# Patient Record
Sex: FEMALE | Race: BLACK OR AFRICAN AMERICAN | Employment: UNEMPLOYED
[De-identification: names, ages, dates, MRNs, and addresses within clinical notes are randomized per-mention and may not be internally consistent; named-entity substitution may affect disease eponyms.]

---

## 2019-11-08 ENCOUNTER — HOSPITAL ENCOUNTER (OUTPATIENT)
Dept: PHYSICAL THERAPY | Facility: CLINIC | Age: 34
Setting detail: THERAPIES SERIES
Discharge: HOME | End: 2019-11-08
Payer: MEDICARE

## 2019-11-15 ENCOUNTER — HOSPITAL ENCOUNTER (OUTPATIENT)
Dept: PHYSICAL THERAPY | Facility: CLINIC | Age: 34
Setting detail: THERAPIES SERIES
Discharge: HOME | End: 2019-11-15
Payer: MEDICARE

## 2019-11-15 PROCEDURE — 97161 PT EVAL LOW COMPLEX 20 MIN: CPT

## 2019-11-15 PROCEDURE — 97110 THERAPEUTIC EXERCISES: CPT

## 2019-11-20 ENCOUNTER — HOSPITAL ENCOUNTER (OUTPATIENT)
Dept: PHYSICAL THERAPY | Facility: CLINIC | Age: 34
Setting detail: THERAPIES SERIES
Discharge: HOME | End: 2019-11-20
Payer: MEDICARE

## 2019-11-20 PROCEDURE — 97110 THERAPEUTIC EXERCISES: CPT

## 2019-11-22 ENCOUNTER — HOSPITAL ENCOUNTER (OUTPATIENT)
Dept: PHYSICAL THERAPY | Facility: CLINIC | Age: 34
Setting detail: THERAPIES SERIES
Discharge: HOME | End: 2019-11-22
Payer: MEDICARE

## 2019-11-22 PROCEDURE — 97110 THERAPEUTIC EXERCISES: CPT

## 2019-11-26 ENCOUNTER — HOSPITAL ENCOUNTER (OUTPATIENT)
Dept: PHYSICAL THERAPY | Facility: CLINIC | Age: 34
Setting detail: THERAPIES SERIES
Discharge: HOME | End: 2019-11-26
Payer: MEDICARE

## 2019-11-27 ENCOUNTER — HOSPITAL ENCOUNTER (OUTPATIENT)
Dept: PHYSICAL THERAPY | Facility: CLINIC | Age: 34
Setting detail: THERAPIES SERIES
Discharge: HOME | End: 2019-11-27
Payer: MEDICARE

## 2019-12-03 ENCOUNTER — APPOINTMENT (OUTPATIENT)
Dept: PHYSICAL THERAPY | Facility: CLINIC | Age: 34
End: 2019-12-03
Payer: MEDICARE

## 2019-12-03 ENCOUNTER — HOSPITAL ENCOUNTER (OUTPATIENT)
Dept: PHYSICAL THERAPY | Facility: CLINIC | Age: 34
Setting detail: THERAPIES SERIES
Discharge: HOME | End: 2019-12-03
Payer: MEDICARE

## 2019-12-03 PROCEDURE — 97110 THERAPEUTIC EXERCISES: CPT

## 2019-12-04 ENCOUNTER — HOSPITAL ENCOUNTER (OUTPATIENT)
Dept: PHYSICAL THERAPY | Facility: CLINIC | Age: 34
Setting detail: THERAPIES SERIES
Discharge: HOME | End: 2019-12-04
Payer: MEDICARE

## 2019-12-10 ENCOUNTER — HOSPITAL ENCOUNTER (OUTPATIENT)
Dept: PHYSICAL THERAPY | Facility: CLINIC | Age: 34
Setting detail: THERAPIES SERIES
Discharge: HOME | End: 2019-12-10
Payer: MEDICARE

## 2019-12-10 PROCEDURE — 97110 THERAPEUTIC EXERCISES: CPT

## 2019-12-12 ENCOUNTER — HOSPITAL ENCOUNTER (OUTPATIENT)
Dept: PHYSICAL THERAPY | Facility: CLINIC | Age: 34
Setting detail: THERAPIES SERIES
Discharge: HOME | End: 2019-12-12
Payer: MEDICARE

## 2019-12-12 PROCEDURE — 97110 THERAPEUTIC EXERCISES: CPT

## 2019-12-17 ENCOUNTER — HOSPITAL ENCOUNTER (OUTPATIENT)
Dept: PHYSICAL THERAPY | Facility: CLINIC | Age: 34
Setting detail: THERAPIES SERIES
Discharge: HOME | End: 2019-12-17
Payer: MEDICARE

## 2019-12-17 PROCEDURE — 97110 THERAPEUTIC EXERCISES: CPT

## 2019-12-20 ENCOUNTER — HOSPITAL ENCOUNTER (OUTPATIENT)
Dept: PHYSICAL THERAPY | Facility: CLINIC | Age: 34
Setting detail: THERAPIES SERIES
Discharge: HOME | End: 2019-12-20
Payer: MEDICARE

## 2019-12-20 PROCEDURE — 97110 THERAPEUTIC EXERCISES: CPT

## 2019-12-23 ENCOUNTER — APPOINTMENT (OUTPATIENT)
Dept: PHYSICAL THERAPY | Facility: CLINIC | Age: 34
End: 2019-12-23
Payer: MEDICARE

## 2019-12-23 ENCOUNTER — HOSPITAL ENCOUNTER (OUTPATIENT)
Dept: PHYSICAL THERAPY | Facility: CLINIC | Age: 34
Setting detail: THERAPIES SERIES
Discharge: HOME | End: 2019-12-23
Payer: MEDICARE

## 2019-12-26 ENCOUNTER — APPOINTMENT (OUTPATIENT)
Dept: PHYSICAL THERAPY | Facility: CLINIC | Age: 34
End: 2019-12-26
Payer: MEDICARE

## 2019-12-27 ENCOUNTER — HOSPITAL ENCOUNTER (OUTPATIENT)
Dept: PHYSICAL THERAPY | Facility: CLINIC | Age: 34
Setting detail: THERAPIES SERIES
Discharge: HOME | End: 2019-12-27
Payer: MEDICARE

## 2019-12-27 PROCEDURE — 97110 THERAPEUTIC EXERCISES: CPT

## 2020-01-03 ENCOUNTER — APPOINTMENT (OUTPATIENT)
Dept: PHYSICAL THERAPY | Facility: CLINIC | Age: 35
End: 2020-01-03
Payer: MEDICARE

## 2020-01-03 ENCOUNTER — HOSPITAL ENCOUNTER (OUTPATIENT)
Dept: PHYSICAL THERAPY | Facility: CLINIC | Age: 35
Setting detail: THERAPIES SERIES
Discharge: HOME | End: 2020-01-03
Payer: MEDICARE

## 2020-01-07 ENCOUNTER — HOSPITAL ENCOUNTER (OUTPATIENT)
Dept: PHYSICAL THERAPY | Facility: CLINIC | Age: 35
Setting detail: THERAPIES SERIES
Discharge: HOME | End: 2020-01-07
Payer: MEDICARE

## 2020-01-07 PROCEDURE — 97110 THERAPEUTIC EXERCISES: CPT

## 2020-01-07 NOTE — FLOWSHEET NOTE
Bridges x20 BTB Added 11/20   Heel slides x20   Added 11/20; Bilaterally   PROM   Added 11/20; 12/17/19- HELD               Sidelying      Abduction x20 ea 2# Added weight 12/10/19- bilaterally Progressed weight 1/7   Clam shells x10 ea Lime  Added 12/12          Prone      Hip ext  x20 ea 1# Added weight 12/10/19; 12/17/19 HELD         Standing      Standing calf stretch- wedge 3x30\" ea LOW Added 11/20   Standing quad str. 2x30\" ea  B LEs; UE holding LE BEHIND; cues for dec hip ABD; opp UE to A   TG SL squats x20 ea Lv 7 CUES for form; < 90/90 for DL squats, added Blue tband around knees for squats 12/3/19; resume next visit   Step ups x10 ea 6\"; PTB With blue tband around knees- added 12/3/19; B LEs; PTB- 12/27/2019   Step downs x10 ea 6\"; PTB With blue tband around knees- added 12/3/19; PTB- 12/27/2019   3 way hip x10 ea PTB Added 12/10/19; B LEs; 1 UE A; PTB- 12/27/2019   DL HR- off stair edge x20 ea 6\" NEW- 12/20/2019; UE A   Goal update     COMPLETED- 12/20/2019   LEFS   COMPLETED- 12/20/2019   Heel taps x10 ea 6\"; PTB B LEs; B UE A; min cues; NEW- 12/20/2019; PTB- 12/27/2019   Resisted side-stepping  2x30\"  PTB    // BAR SQUATS x20 UE A 90/90         Tandem stance on airex 2x20\" ea  Added 1/7   SLS balance 3x20\" ea  Added 1/7   Stationary lunges 10x ea  Added 1/7                       Other: BOLD IS COMPLETED. Specific Instructions for next treatment: Consider bolded interventions above next visit- begin with next session. Focus upon stabilization interventions to prevent further instability and poor quad control. Treatment Charges: Mins Units   []  Modalities     [x]  Ther Exercise 40 3   []  Manual Therapy     []  Ther Activities     []  Aquatics     []  Vasocompression     []  Other     Total Treatment time 40 3     [x8' on upright bike]    Assessment: [x] Progressing toward goals. Pt with overall good tolerance to treatment this date.  Able to increase patient with added exercises to further challenge her. Able to complete SLS and tandem balance with out LOB. Intermittent cueing to ensure proper form when completing exercises. Will continue to progress as able in future visits. [] No change. [] Other:    STG: (to be met in 6 treatments)  1. ? Pain: Patient will report no pain at rest and < 5/10 pain with active movement in order to improve QOL. - PROGRESSING- 0-5/10 over this past week; < episodes of 5/10 pain  2. ? ROM: Pt will demo R knee AROM flexion to equal L and with < 3/10 P in order to improve functional mobility.- MET  3. ? Strength: Pt will demo x20 SLR flexion of R LE through full ROM and with < 3/10 P in order to better match L and improve quad endurance for ambulation, stairs. Will demo dec hyperextension of both knees in standing, functional activities in order to provide protection to posterior ligaments.- PROGRESSING- x20 SLR flexion of B LEs without P, but continued challenge; continued cues for dec hyperextension during interventions in clinic   4. ? Function: Patient will report decreased TTP to R patellar tendon, lateral knee joint line in order to indicate decreased inflammation and improved healing of injured site. - PROGRESSING- continued R patellar tendon tenderness; dec along B knee lateral joint lines  5. Independent with Home Exercise Programs- PROGRESSING- reports intermittent performance d/t hectic home life   LTG: (to be met in 12 treatments)  1. Will demo 20\" SLS balance of R LE in order to better match L LE and improve stair, hill negotiation. Also proper squat mechanics with < 5/10 P for improved lifting, fitness tolerance. 2. Will ambulate full work shift with < 5/10 P in order to improve work tolerance. 3. Improve LEFS to 40/80- MET- 41/80    NEW GOAL- 12/20/2019:  Improve LEFS to 55/80           Patient goals: \"less pain. \"- PROGRESSING    Pt.  Education:  [x] Yes  [] No  [x] Reviewed Prior HEP/Ed  Method of Education: [x] Verbal  [] Demo  [x] Written -  12/12- 3 way hip (standing and table), bridging, clam shells, step ups, gastroc stretching - theraband provided  Comprehension of Education:  [x] Verbalizes understanding. [x] Demonstrates understanding. [x] Needs review. [] Demonstrates/verbalizes HEP/Ed previously given. 12/20/2019- Rec continued PT services until x12 visits in order to focus upon SLS balance activities and improved tolerance for group fitness classes- verbalized understanding to all. Plan: [x] Continue per plan of care.    [] Other:       Time In: 150PM          Time Out: 245 PM    Electronically signed by:  Luh Tee PTA

## 2020-01-09 ENCOUNTER — APPOINTMENT (OUTPATIENT)
Dept: PHYSICAL THERAPY | Facility: CLINIC | Age: 35
End: 2020-01-09
Payer: MEDICARE

## 2020-01-10 ENCOUNTER — HOSPITAL ENCOUNTER (OUTPATIENT)
Dept: PHYSICAL THERAPY | Facility: CLINIC | Age: 35
Setting detail: THERAPIES SERIES
Discharge: HOME | End: 2020-01-10
Payer: MEDICARE

## 2020-01-10 PROCEDURE — 97110 THERAPEUTIC EXERCISES: CPT

## 2020-01-10 NOTE — FLOWSHEET NOTE
[] [facility]) - [facility]  [address]  [phone]  F: [phone] [x] [address]  [facility] [place] [facility]   [address]  P: [phone]  F: [phone] [] [address] &  Therapy  [address] [facility]  P: [phone]  F: [phone] [] [address]  [facility]   [address]   [place]: [phone]  F: [phone]      Physical Therapy Daily Treatment Note    Date:  1/10/2020  Patient Name:  Art De León \"Gnoc-ivelisse\"   :  1985  MRN: [id number]  Physician: Dr. Davina Garcia MD     Insurance: 30 v  Diagnosis: Right knee pain   Onset Date:  chronic denies recent flare up  Next Dr. Lara Stain: 2020  Visit# / total visits:  ( updated  )  Cancels/No Shows: 4/0    Subjective:    Pain:  [x] Yes  [] No Location: R knee- infrapatellar, lateral joint line Pain Rating: (0-10 scale) 2/10- \"achey\"  Pain altered Tx:  [x] No  [] Yes  Action: N/A  Comments: Pt reports her knee is alright today. Reports it was a little achy yesterday. States she has been going to the gym as well. Objective:   Todays Treatment:   Modalities: CP PRN   Precautions: chronic B knee pain- R > L- unclear R meniscus pathology- pain may be secondary to patellar instability; pain localized to infrapatellar and lateral joint line; no previous PT services or treatments   Exercises: Exercises completed bilaterally   Exercise Reps/ Time Weight/ Level Comments   Nustep   Added - HELD; SEE BELOW    Rec bike x5' L4 NEW- 2019; full revolutions; increased to L4 19   Upright bike x8' S2-4  L2.5 NEW- 2019   Supine      SLR 15x ea 2# Increased reps and added weight 12/10/19 Increased weight 1/   SAQ 10x5\" holds 1# Added weight 12/10/19; bilaterally    Clamshells x20 ea BTB Added ; B   Bridges x20 BTB Added  [email or web address] provided  Comprehension of Education:  [x] Verbalizes understanding. [x] Demonstrates understanding. [x] Needs review. [] Demonstrates/verbalizes HEP/Ed previously given. 12/20/2019- Rec continued PT services until x12 visits in order to focus upon SLS balance activities and improved tolerance for group fitness classes- verbalized understanding to all. Plan: [x] Continue per plan of care.    [] Other:       Time In: 910 AM          Time Out: 10:00 aM    Electronically signed by:  Anya Downs PTA

## 2020-01-15 ENCOUNTER — HOSPITAL ENCOUNTER (OUTPATIENT)
Dept: PHYSICAL THERAPY | Facility: CLINIC | Age: 35
Setting detail: THERAPIES SERIES
Discharge: HOME | End: 2020-01-15
Payer: MEDICARE

## 2020-01-22 ENCOUNTER — HOSPITAL ENCOUNTER (OUTPATIENT)
Dept: PHYSICAL THERAPY | Facility: CLINIC | Age: 35
Setting detail: THERAPIES SERIES
Discharge: HOME | End: 2020-01-22
Payer: MEDICARE

## 2020-01-24 ENCOUNTER — HOSPITAL ENCOUNTER (OUTPATIENT)
Dept: PHYSICAL THERAPY | Facility: CLINIC | Age: 35
Setting detail: THERAPIES SERIES
Discharge: HOME | End: 2020-01-24
Payer: MEDICARE

## 2020-01-24 PROCEDURE — 97110 THERAPEUTIC EXERCISES: CPT

## 2020-01-24 NOTE — DISCHARGE SUMMARY
verbalized understanding to all. Added PTB loop for previous interventions in order to emphasize dec B knee medial collapse and issued for HEP. Also performed goal update and re-assessed LEFS this date. Meets majority of therapy goals, and demos sig improved LEFS score. Therefore, discharged today from PT services. STG: (to be met in 6 treatments)  1. ? Pain: Patient will report no pain at rest and < 5/10 pain with active movement in order to improve QOL.- MET  2. ? ROM: Pt will demo R knee AROM flexion to equal L and with < 3/10 P in order to improve functional mobility.- MET  3. ? Strength: Pt will demo x20 SLR flexion of R LE through full ROM and with < 3/10 P in order to better match L and improve quad endurance for ambulation, stairs. Will demo dec hyperextension of both knees in standing, functional activities in order to provide protection to posterior ligaments. - MET- demos sig improved proprioception and corrects for B knee hyperextension during all interventions in clinic   4. ? Function: Patient will report decreased TTP to R patellar tendon, lateral knee joint line in order to indicate decreased inflammation and improved healing of injured site. - PROGRESSING- continued R patellar tendon tenderness; dec along B knee lateral joint lines  5. Independent with Home Exercise Programs- PROGRESSING- reports intermittent performance d/t hectic home life   LTG: (to be met in 12 treatments)  1. Will demo 20\" SLS balance of R LE in order to better match L LE and improve stair, hill negotiation. Also proper squat mechanics with < 5/10 P for improved lifting, fitness tolerance. - MET  2. Will ambulate full work shift with < 5/10 P in order to improve work tolerance. - MET- \"I've got more good days then bad days. \"   3. Improve LEFS to 40/80- MET- 41/80     NEW GOAL- 12/20/2019:  Improve LEFS to 55/80- MET- 63/80     Patient goals: \"less pain. \"- PROGRESSING    Treatment to Date:  [x] Therapeutic Exercise    [x]

## 2020-01-24 NOTE — FLOWSHEET NOTE
hectic home life   LTG: (to be met in 12 treatments)  1. Will demo 20\" SLS balance of R LE in order to better match L LE and improve stair, hill negotiation. Also proper squat mechanics with < 5/10 P for improved lifting, fitness tolerance. - MET  2. Will ambulate full work shift with < 5/10 P in order to improve work tolerance. - MET- \"I've got more good days then bad days. \"   3. Improve LEFS to 40/80- MET- 41/80     NEW GOAL- 12/20/2019:  Improve LEFS to 55/80- MET- 63/80           Patient goals: \"less pain. \"- PROGRESSING    Pt. Education:  [x] Yes  [] No  [x] Reviewed Prior HEP/Ed  Method of Education: [] Verbal  [] Demo  [] Written -  12/12- 3 way hip (standing and table), bridging, clam shells, step ups, gastroc stretching - theraband provided  Comprehension of Education:  [] Verbalizes understanding. [] Demonstrates understanding. [] Needs review. [x] Demonstrates/verbalizes HEP/Ed previously given. 12/20/2019- Rec continued PT services until x12 visits in order to focus upon SLS balance activities and improved tolerance for group fitness classes- verbalized understanding to all.   1/24/2020- Discharge today from PT services. Issued PTB loop for HEP interventions. Plan: [x] Continue per plan of care. [x] Other: Discharge today from PT services.         Time In: 1PM          Time Out: 1:40PM    Electronically signed by:  Eddie Barbosa, PT

## 2021-06-12 ENCOUNTER — HOSPITAL ENCOUNTER (EMERGENCY)
Age: 36
Discharge: HOME | End: 2021-06-13
Attending: EMERGENCY MEDICINE
Payer: MEDICARE

## 2021-06-12 VITALS
HEIGHT: 64 IN | HEART RATE: 103 BPM | TEMPERATURE: 98.6 F | BODY MASS INDEX: 39.27 KG/M2 | OXYGEN SATURATION: 100 % | WEIGHT: 230 LBS | RESPIRATION RATE: 20 BRPM | SYSTOLIC BLOOD PRESSURE: 143 MMHG | DIASTOLIC BLOOD PRESSURE: 84 MMHG

## 2021-06-12 DIAGNOSIS — S39.012A BACK STRAIN, INITIAL ENCOUNTER: Primary | ICD-10-CM

## 2021-06-12 PROCEDURE — 6370000000 HC RX 637 (ALT 250 FOR IP): Performed by: EMERGENCY MEDICINE

## 2021-06-12 RX ORDER — LORAZEPAM 1 MG/1
1 TABLET ORAL ONCE
Status: COMPLETED | OUTPATIENT
Start: 2021-06-12 | End: 2021-06-12

## 2021-06-12 RX ORDER — FOLIC ACID 1 MG/1
TABLET ORAL
COMMUNITY

## 2021-06-12 RX ORDER — METOPROLOL SUCCINATE 25 MG/1
TABLET, EXTENDED RELEASE ORAL
COMMUNITY

## 2021-06-12 RX ORDER — LORATADINE 10 MG/1
TABLET ORAL
COMMUNITY

## 2021-06-12 RX ORDER — ICOSAPENT ETHYL 1000 MG/1
CAPSULE ORAL
COMMUNITY

## 2021-06-12 RX ORDER — HYDROXYUREA 500 MG/1
CAPSULE ORAL
COMMUNITY

## 2021-06-12 RX ORDER — MONTELUKAST SODIUM 10 MG/1
TABLET ORAL
COMMUNITY

## 2021-06-12 RX ORDER — ERGOCALCIFEROL (VITAMIN D2) 1250 MCG
CAPSULE ORAL
COMMUNITY

## 2021-06-12 RX ORDER — FERROUS SULFATE 325(65) MG
325 TABLET ORAL
COMMUNITY

## 2021-06-12 RX ORDER — FLUTICASONE PROPIONATE 50 MCG
SPRAY, SUSPENSION (ML) NASAL
COMMUNITY

## 2021-06-12 RX ORDER — LISINOPRIL 5 MG/1
TABLET ORAL
COMMUNITY

## 2021-06-12 RX ORDER — HYDROCODONE BITARTRATE AND ACETAMINOPHEN 5; 325 MG/1; MG/1
1 TABLET ORAL ONCE
Status: COMPLETED | OUTPATIENT
Start: 2021-06-12 | End: 2021-06-12

## 2021-06-12 RX ADMIN — HYDROCODONE BITARTRATE AND ACETAMINOPHEN 1 TABLET: 5; 325 TABLET ORAL at 23:37

## 2021-06-12 RX ADMIN — LORAZEPAM 1 MG: 1 TABLET ORAL at 23:37

## 2021-06-12 ASSESSMENT — PAIN SCALES - GENERAL
PAINLEVEL_OUTOF10: 10
PAINLEVEL_OUTOF10: 10

## 2021-06-12 ASSESSMENT — PAIN DESCRIPTION - PAIN TYPE: TYPE: ACUTE PAIN

## 2021-06-12 ASSESSMENT — PAIN DESCRIPTION - FREQUENCY: FREQUENCY: INTERMITTENT

## 2021-06-12 ASSESSMENT — PAIN DESCRIPTION - LOCATION: LOCATION: BACK

## 2021-06-12 ASSESSMENT — PAIN DESCRIPTION - DESCRIPTORS: DESCRIPTORS: TENDER;THROBBING

## 2021-06-12 ASSESSMENT — PAIN DESCRIPTION - ORIENTATION: ORIENTATION: UPPER;RIGHT

## 2021-06-13 ENCOUNTER — APPOINTMENT (OUTPATIENT)
Dept: GENERAL RADIOLOGY | Age: 36
End: 2021-06-13
Payer: MEDICARE

## 2021-06-13 VITALS
BODY MASS INDEX: 39.27 KG/M2 | WEIGHT: 230 LBS | OXYGEN SATURATION: 97 % | RESPIRATION RATE: 16 BRPM | DIASTOLIC BLOOD PRESSURE: 82 MMHG | TEMPERATURE: 98.3 F | SYSTOLIC BLOOD PRESSURE: 130 MMHG | HEIGHT: 64 IN | HEART RATE: 90 BPM

## 2021-06-13 DIAGNOSIS — S39.012A BACK STRAIN, INITIAL ENCOUNTER: Primary | ICD-10-CM

## 2021-06-13 DIAGNOSIS — N30.00 ACUTE CYSTITIS WITHOUT HEMATURIA: ICD-10-CM

## 2021-06-13 LAB
ABSOLUTE EOS #: 0.04 K/UL (ref 0–0.44)
ABSOLUTE IMMATURE GRANULOCYTE: 0.03 K/UL (ref 0–0.3)
ABSOLUTE LYMPH #: 2.03 K/UL (ref 1.1–3.7)
ABSOLUTE MONO #: 0.53 K/UL (ref 0.1–1.2)
ANION GAP SERPL CALCULATED.3IONS-SCNC: 15 MMOL/L (ref 9–17)
BASOPHILS # BLD: 0 % (ref 0–2)
BASOPHILS ABSOLUTE: <0.03 K/UL (ref 0–0.2)
BUN BLDV-MCNC: 6 MG/DL (ref 6–20)
BUN/CREAT BLD: 10 (ref 9–20)
CALCIUM SERPL-MCNC: 9 MG/DL (ref 8.6–10.4)
CHLORIDE BLD-SCNC: 96 MMOL/L (ref 98–107)
CO2: 25 MMOL/L (ref 20–31)
CREAT SERPL-MCNC: 0.61 MG/DL (ref 0.5–0.9)
D-DIMER QUANTITATIVE: 0.34 MG/L FEU (ref 0–0.59)
DIFFERENTIAL TYPE: ABNORMAL
EOSINOPHILS RELATIVE PERCENT: 0 % (ref 1–4)
GFR AFRICAN AMERICAN: >60 ML/MIN
GFR NON-AFRICAN AMERICAN: >60 ML/MIN
GFR SERPL CREATININE-BSD FRML MDRD: ABNORMAL ML/MIN/{1.73_M2}
GFR SERPL CREATININE-BSD FRML MDRD: ABNORMAL ML/MIN/{1.73_M2}
GLUCOSE BLD-MCNC: 93 MG/DL (ref 70–99)
HCG QUALITATIVE: NEGATIVE
HCT VFR BLD CALC: 28.2 % (ref 36.3–47.1)
HEMOGLOBIN: 9.6 G/DL (ref 11.9–15.1)
IMMATURE GRANULOCYTES: 0 %
LYMPHOCYTES # BLD: 23 % (ref 24–43)
MCH RBC QN AUTO: 28.7 PG (ref 25.2–33.5)
MCHC RBC AUTO-ENTMCNC: 34 G/DL (ref 28.4–34.8)
MCV RBC AUTO: 84.4 FL (ref 82.6–102.9)
MONOCYTES # BLD: 6 % (ref 3–12)
NRBC AUTOMATED: 0 PER 100 WBC
PDW BLD-RTO: 17.1 % (ref 11.8–14.4)
PLATELET # BLD: 151 K/UL (ref 138–453)
PLATELET ESTIMATE: ABNORMAL
PMV BLD AUTO: 11.5 FL (ref 8.1–13.5)
POTASSIUM SERPL-SCNC: 3.6 MMOL/L (ref 3.7–5.3)
RBC # BLD: 3.34 M/UL (ref 3.95–5.11)
RBC # BLD: ABNORMAL 10*6/UL
SEG NEUTROPHILS: 71 % (ref 36–65)
SEGMENTED NEUTROPHILS ABSOLUTE COUNT: 6.28 K/UL (ref 1.5–8.1)
SODIUM BLD-SCNC: 136 MMOL/L (ref 135–144)
TROPONIN INTERP: NORMAL
TROPONIN T: NORMAL NG/ML
TROPONIN, HIGH SENSITIVITY: <6 NG/L (ref 0–14)
WBC # BLD: 8.9 K/UL (ref 3.5–11.3)
WBC # BLD: ABNORMAL 10*3/UL

## 2021-06-13 PROCEDURE — 96374 THER/PROPH/DIAG INJ IV PUSH: CPT

## 2021-06-13 PROCEDURE — 85025 COMPLETE CBC W/AUTO DIFF WBC: CPT

## 2021-06-13 PROCEDURE — 6360000002 HC RX W HCPCS: Performed by: EMERGENCY MEDICINE

## 2021-06-13 PROCEDURE — 6370000000 HC RX 637 (ALT 250 FOR IP): Performed by: EMERGENCY MEDICINE

## 2021-06-13 PROCEDURE — 85379 FIBRIN DEGRADATION QUANT: CPT

## 2021-06-13 PROCEDURE — 96372 THER/PROPH/DIAG INJ SC/IM: CPT

## 2021-06-13 PROCEDURE — 71045 X-RAY EXAM CHEST 1 VIEW: CPT

## 2021-06-13 PROCEDURE — 80048 BASIC METABOLIC PNL TOTAL CA: CPT

## 2021-06-13 PROCEDURE — 99282 EMERGENCY DEPT VISIT SF MDM: CPT

## 2021-06-13 PROCEDURE — 84484 ASSAY OF TROPONIN QUANT: CPT

## 2021-06-13 PROCEDURE — 87186 SC STD MICRODIL/AGAR DIL: CPT

## 2021-06-13 PROCEDURE — 84703 CHORIONIC GONADOTROPIN ASSAY: CPT

## 2021-06-13 PROCEDURE — 87088 URINE BACTERIA CULTURE: CPT

## 2021-06-13 PROCEDURE — 87086 URINE CULTURE/COLONY COUNT: CPT

## 2021-06-13 PROCEDURE — 93005 ELECTROCARDIOGRAM TRACING: CPT | Performed by: EMERGENCY MEDICINE

## 2021-06-13 RX ORDER — PREDNISONE 20 MG/1
60 TABLET ORAL ONCE
Status: COMPLETED | OUTPATIENT
Start: 2021-06-13 | End: 2021-06-13

## 2021-06-13 RX ORDER — HYDROCODONE BITARTRATE AND ACETAMINOPHEN 5; 325 MG/1; MG/1
1 TABLET ORAL EVERY 6 HOURS PRN
Qty: 10 TABLET | Refills: 0 | Status: SHIPPED | OUTPATIENT
Start: 2021-06-13 | End: 2021-06-16

## 2021-06-13 RX ORDER — MORPHINE SULFATE 4 MG/ML
4 INJECTION, SOLUTION INTRAMUSCULAR; INTRAVENOUS ONCE
Status: COMPLETED | OUTPATIENT
Start: 2021-06-13 | End: 2021-06-13

## 2021-06-13 RX ORDER — PREDNISONE 50 MG/1
50 TABLET ORAL DAILY
Qty: 5 TABLET | Refills: 0 | Status: SHIPPED | OUTPATIENT
Start: 2021-06-13 | End: 2021-06-18

## 2021-06-13 RX ORDER — CEPHALEXIN 500 MG/1
500 CAPSULE ORAL 2 TIMES DAILY
Qty: 14 CAPSULE | Refills: 0 | Status: SHIPPED | OUTPATIENT
Start: 2021-06-13 | End: 2021-06-20

## 2021-06-13 RX ORDER — ORPHENADRINE CITRATE 30 MG/ML
60 INJECTION INTRAMUSCULAR; INTRAVENOUS ONCE
Status: COMPLETED | OUTPATIENT
Start: 2021-06-13 | End: 2021-06-13

## 2021-06-13 RX ORDER — ALPRAZOLAM 0.5 MG/1
0.5 TABLET ORAL EVERY 8 HOURS PRN
Qty: 10 TABLET | Refills: 0 | Status: SHIPPED | OUTPATIENT
Start: 2021-06-13 | End: 2021-06-27

## 2021-06-13 RX ADMIN — ORPHENADRINE CITRATE 60 MG: 60 INJECTION INTRAMUSCULAR; INTRAVENOUS at 08:30

## 2021-06-13 RX ADMIN — MORPHINE SULFATE 4 MG: 4 INJECTION, SOLUTION INTRAMUSCULAR; INTRAVENOUS at 08:30

## 2021-06-13 RX ADMIN — PREDNISONE 60 MG: 20 TABLET ORAL at 09:22

## 2021-06-13 ASSESSMENT — ENCOUNTER SYMPTOMS
VOMITING: 0
DIARRHEA: 0
BACK PAIN: 1
EYE DISCHARGE: 0
RHINORRHEA: 0
SHORTNESS OF BREATH: 0
EYE REDNESS: 0
COUGH: 0
NAUSEA: 0
SORE THROAT: 0
COLOR CHANGE: 0

## 2021-06-13 ASSESSMENT — PAIN SCALES - GENERAL
PAINLEVEL_OUTOF10: 10
PAINLEVEL_OUTOF10: 10

## 2021-06-13 ASSESSMENT — PAIN DESCRIPTION - LOCATION: LOCATION: BACK

## 2021-06-13 ASSESSMENT — PAIN DESCRIPTION - ORIENTATION: ORIENTATION: UPPER;RIGHT

## 2021-06-13 NOTE — ED PROVIDER NOTES
EMERGENCY DEPARTMENT ENCOUNTER    Pt Name: Nataly Hernandez  MRN: [id number]  [place] 1985  Date of evaluation: 6/12/21  CHIEF COMPLAINT       Chief Complaint   Patient presents with    Back Pain     HISTORY OF PRESENT ILLNESS   Patient is a 52-year-old female who presents to the ED for evaluation of back pain. Pain started 2 days ago over mid back. Pain seemed improved yesterday however today pain returned over right side of back. No trauma, however she has a special needs child and is lifting her heavy wheelchair often. No numbness or tingling upper extremities. No previous history of neck or back trauma. She has not taken anything for pain. No other issues at this time. No fevers, cough, shortness of breath, chest pain, abdominal pain, nausea, vomiting, changes in urine or stool. REVIEW OF SYSTEMS     Review of Systems   All other systems reviewed and are negative. PASTMEDICAL HISTORY   No past medical history on file. SURGICAL HISTORY     No past surgical history on file. CURRENT MEDICATIONS       Previous Medications    ERGOCALCIFEROL (ERGOCALCIFEROL) 1.25 MG (41050 UT) CAPSULE    Vitamin D2 1,250 mcg (50,000 unit) capsule    FERROUS SULFATE (IRON 325) 325 (65 FE) MG TABLET    Take 325 mg by mouth daily (with breakfast)    FLUTICASONE (FLONASE) 50 MCG/ACT NASAL SPRAY    fluticasone propionate 50 mcg/actuation nasal spray,suspension   as needed    FOLIC ACID (FOLVITE) 1 MG TABLET    folic acid 1 mg tablet   take 1 tablet by mouth once daily    HYDROXYUREA (HYDREA) 500 MG CHEMO CAPSULE    hydroxyurea 500 mg capsule   take 1 capsule by mouth once daily    ICOSAPENT ETHYL (VASCEPA) 1 G CAPS CAPSULE    icosapent ethyl 1 gram capsule   take 2 capsules by mouth twice a day    LISINOPRIL (PRINIVIL;ZESTRIL) 5 MG TABLET    lisinopril 5 mg tablet   Take 1 tablet(s) every day by oral route.     LORATADINE (CLARITIN) 10 MG TABLET    loratadine 10 mg tablet   take 1 tablet by mouth once daily    LORCASERIN hemodynamically stable, afebrile, nontoxic-appearing. Physical exam notable for tender right paraspinal thoracic tenderness. Based on history and exam likely muscle strain, nerve bundle tenderness. ED plan for Ativan, Norco, reassess. DIAGNOSTIC RESULTS   EKG:All EKG's are interpreted by the Emergency Department Physician who either signs or Co-signs this chart in the absence of a cardiologist.        RADIOLOGY:All plain film, CT, MRI, and formal ultrasound images (except ED bedside ultrasound) are read by the radiologist, see reports below, unless otherwisenoted in MDM or here. No orders to display     LABS: All lab results were reviewed by myself, and all abnormals are listed below. Labs Reviewed - No data to display    EMERGENCY DEPARTMENTCOURSE:   Patient did well in the ED. Pain improved with Percocet and Ativan. Given Rx for Xanax for muscle spasms. No further work-up indicated this time. Family at bedside will drive patient home. Nursing notes reviewed. At this time this is what I find, the patient appears well and does not appear sick or toxic. I gave my usual and customary discussion of the risks and benefits of discharge versus admission. I answered the patient's questions. I gave the patient strict return precautions. Patient expressed understanding of the discharge instructions. Dictated but not reviewed. Vitals:    Vitals:    06/12/21 2311   BP: (!) 143/84   Pulse: 103   Resp: 20   Temp: 98.6 °F (37 °C)   TempSrc: Oral   SpO2: 100%   Weight: 230 lb (104.3 kg)   Height: 5' 4\" (1.626 m)       The patient was given the following medications while in the emergency department:  Orders Placed This Encounter   Medications    HYDROcodone-acetaminophen (NORCO) 5-325 MG per tablet 1 tablet    LORazepam (ATIVAN) tablet 1 mg    ALPRAZolam (XANAX) 0.5 MG tablet     Sig: Take 1 tablet by mouth every 8 hours as needed (Spasms) for up to 10 doses.      Dispense:  10 tablet Refill:  0     CONSULTS:  None    FINAL IMPRESSION      1. Back strain, initial encounter          DISPOSITION/PLAN   DISPOSITION        PATIENT REFERRED TO:  Tone Roca MD  [address] [place] [facility]  [phone]          DISCHARGE MEDICATIONS:  New Prescriptions    ALPRAZOLAM [place]) 0.5 MG TABLET    Take 1 tablet by mouth every 8 hours as needed (Spasms) for up to 10 doses.      Genaro Scott MD  Attending Emergency Physician                    Alen Diana MD  06/13/21 3551

## 2021-06-13 NOTE — ED PROVIDER NOTES
PASTMEDICAL HISTORY     Past Medical History:   Diagnosis Date    Anemia     Depression     Diabetes mellitus ([address]     Goiter     Hyperlipidemia     Hypertension     Reflux esophagitis     Thyroid disease      Past Problem List  There is no problem list on file for this patient. SURGICAL HISTORY       Past Surgical History:   Procedure Laterality Date     SECTION      CHOLECYSTECTOMY      SALPINGECTOMY Left     ectopic    THYROIDECTOMY      partial    TUBAL LIGATION       CURRENT MEDICATIONS       Previous Medications    ALPRAZOLAM (XANAX) 0.5 MG TABLET    Take 1 tablet by mouth every 8 hours as needed (Spasms) for up to 10 doses. ERGOCALCIFEROL (ERGOCALCIFEROL) 1.25 MG (35463 UT) CAPSULE    Vitamin D2 1,250 mcg (50,000 unit) capsule    FERROUS SULFATE (IRON 325) 325 (65 FE) MG TABLET    Take 325 mg by mouth daily (with breakfast)    FLUTICASONE (FLONASE) 50 MCG/ACT NASAL SPRAY    fluticasone propionate 50 mcg/actuation nasal spray,suspension   as needed    FOLIC ACID (FOLVITE) 1 MG TABLET    folic acid 1 mg tablet   take 1 tablet by mouth once daily    HYDROXYUREA (HYDREA) 500 MG CHEMO CAPSULE    hydroxyurea 500 mg capsule   take 1 capsule by mouth once daily    ICOSAPENT ETHYL (VASCEPA) 1 G CAPS CAPSULE    icosapent ethyl 1 gram capsule   take 2 capsules by mouth twice a day    LISINOPRIL (PRINIVIL;ZESTRIL) 5 MG TABLET    lisinopril 5 mg tablet   Take 1 tablet(s) every day by oral route. LORATADINE (CLARITIN) 10 MG TABLET    loratadine 10 mg tablet   take 1 tablet by mouth once daily    LORCASERIN HCL 10 MG TABS    Belviq 10 mg tablet   Take 1 tablet twice a day by oral route for 30 days.     METFORMIN (GLUCOPHAGE) 500 MG TABLET    metformin 500 mg tablet   take 1 tablet by mouth twice a day    METOPROLOL SUCCINATE (TOPROL XL) 25 MG EXTENDED RELEASE TABLET    metoprolol succinate ER 25 mg tablet,extended release 24 hr    MONTELUKAST (SINGULAIR) 10 MG TABLET    montelukast 10 mg tablet   take 1 tablet by mouth once daily    VITAMIN D (CHOLECALCIFEROL) 25 MCG (1000 UT) TABS TABLET    Vitamin D3 25 mcg (1,000 unit) tablet     ALLERGIES     has No Known Allergies. FAMILY HISTORY     has no family status information on file. SOCIAL HISTORY       Social History     Tobacco Use    Smoking status: Not on file   Substance Use Topics    Alcohol use: Yes     Comment: occasional    Drug use: Not on file     PHYSICAL EXAM     INITIAL VITALS: /82   Pulse 90   Temp 98.3 °F (36.8 °C) (Oral)   Resp 16   Ht 5' 4\" (1.626 m)   Wt 230 lb (104.3 kg)   LMP 05/25/2021   SpO2 97%   BMI 39.48 kg/m²    Physical Exam  Constitutional:       Appearance: Normal appearance. She is well-developed. She is not ill-appearing or toxic-appearing. HENT:      Head: Normocephalic and atraumatic. Eyes:      Conjunctiva/sclera: Conjunctivae normal.      Pupils: Pupils are equal, round, and reactive to light. Neck:      Trachea: Trachea normal.   Cardiovascular:      Rate and Rhythm: Normal rate and regular rhythm. Heart sounds: S1 normal and S2 normal. No murmur heard. Pulmonary:      Effort: Pulmonary effort is normal. No accessory muscle usage or respiratory distress. Breath sounds: Normal breath sounds. Chest:      Chest wall: No deformity or tenderness. Abdominal:      General: Bowel sounds are normal. There is no distension or abdominal bruit. Palpations: Abdomen is not rigid. Tenderness: There is no abdominal tenderness. There is no guarding or rebound. Musculoskeletal:      Cervical back: Normal range of motion and neck supple. Thoracic back: Spasms and tenderness present. No bony tenderness. Back:    Skin:     General: Skin is warm. Findings: No rash. Neurological:      Mental Status: She is alert and oriented to person, place, and time. GCS: GCS eye subscore is 4. GCS verbal subscore is 5. GCS motor subscore is 6.    Psychiatric: Speech: Speech normal.         MEDICAL DECISION MAKIN-year-old female presents with complaints of back pain, I agree with the previous provider that this is likely musculoskeletal in nature however this is a second visit for the patient to the emergency department. There is some pleuritic component, we will obtain basic labs cardiac enzymes D-dimer and reevaluate. 9:15 AM EDT  Patient's laboratory studies including D-dimer, cardiac enzymes and chest x-ray are unremarkable. The patient's pain is very atypical for acute coronary syndrome, she does not have significant risk factors, she is young, at this time I do not believe she requires a second cardiac enzyme prior to discharge. Her D-dimer was negative, her chest x-ray does not show any evidence of pneumonia or other acute abnormality. Plan is discharged with a short course of pain control, steroids to improve inflammation, she was given muscle relaxants by the previous physician. CRITICAL CARE:       PROCEDURES:    Procedures    DIAGNOSTIC RESULTS   EKG:All EKG's are interpreted by the Emergency Department Physician who either signs or Co-signs this chart in the absence of a cardiologist.    Patient's EKG shows sinus rhythm with rate of 97, VA QRS QTC intervals unremarkable the patient has left axis deviation no ST elevations or depressions, no significant T wave changes. Nonspecific EKG. RADIOLOGY:All plain film, CT, MRI, and formal ultrasound images (except ED bedside ultrasound) are read by the radiologist, see reports below, unless otherwisenoted in MDM or here. XR CHEST PORTABLE   Final Result   Negative portable chest.           LABS: All lab results were reviewed by myself, and all abnormals are listed below.   Labs Reviewed   BASIC METABOLIC PANEL - Abnormal; Notable for the following components:       Result Value    Potassium 3.6 (*)     Chloride 96 (*)     All other components within normal limits   CBC WITH AUTO DIFFERENTIAL - Abnormal; Notable for the following components:    RBC 3.34 (*)     Hemoglobin 9.6 (*)     Hematocrit 28.2 (*)     RDW 17.1 (*)     Seg Neutrophils 71 (*)     Lymphocytes 23 (*)     Eosinophils % 0 (*)     All other components within normal limits   D-DIMER, QUANTITATIVE   TROPONIN   HCG, SERUM, QUALITATIVE   TROPONIN       EMERGENCY DEPARTMENTCOURSE:         Vitals:    Vitals:    06/13/21 0742   BP: 130/82   Pulse: 90   Resp: 16   Temp: 98.3 °F (36.8 °C)   TempSrc: Oral   SpO2: 97%   Weight: 230 lb (104.3 kg)   Height: 5' 4\" (1.626 m)       The patient was given the following medications while in the emergency department:  Orders Placed This Encounter   Medications    orphenadrine (NORFLEX) injection 60 mg    morphine sulfate (PF) injection 4 mg    HYDROcodone-acetaminophen (NORCO) 5-325 MG per tablet     Sig: Take 1 tablet by mouth every 6 hours as needed for Pain for up to 3 days. Dispense:  10 tablet     Refill:  0    predniSONE (DELTASONE) 50 MG tablet     Sig: Take 1 tablet by mouth daily for 5 days     Dispense:  5 tablet     Refill:  0    predniSONE (DELTASONE) tablet 60 mg     CONSULTS:  None    FINAL IMPRESSION      1. Back strain, initial encounter          DISPOSITION/PLAN   DISPOSITION Decision To Discharge 06/13/2021 09:13:54 AM      PATIENT REFERRED TO:  Joesph Huffmanrt Watters  [id number]  [facility] [address]  [phone]    Schedule an appointment as soon as possible for a visit in 2 days      DISCHARGE MEDICATIONS:  New Prescriptions    HYDROCODONE-ACETAMINOPHEN (NORCO) 5-325 MG PER TABLET    Take 1 tablet by mouth every 6 hours as needed for Pain for up to 3 days.     PREDNISONE (DELTASONE) 50 MG TABLET    Take 1 tablet by mouth daily for 5 days     Ayanna Gleason MD  Attending Emergency Physician                   Ayanna Gleason MD  06/13/21 2180

## 2021-06-14 LAB
CULTURE: ABNORMAL
EKG ATRIAL RATE: 97 BPM
EKG P AXIS: 36 DEGREES
EKG P-R INTERVAL: 146 MS
EKG Q-T INTERVAL: 370 MS
EKG QRS DURATION: 82 MS
EKG QTC CALCULATION (BAZETT): 469 MS
EKG R AXIS: -6 DEGREES
EKG T AXIS: 21 DEGREES
EKG VENTRICULAR RATE: 97 BPM
Lab: ABNORMAL
SPECIMEN DESCRIPTION: ABNORMAL

## 2021-06-14 PROCEDURE — 93010 ELECTROCARDIOGRAM REPORT: CPT | Performed by: INTERNAL MEDICINE
